# Patient Record
Sex: FEMALE | Race: WHITE | NOT HISPANIC OR LATINO
[De-identification: names, ages, dates, MRNs, and addresses within clinical notes are randomized per-mention and may not be internally consistent; named-entity substitution may affect disease eponyms.]

---

## 2019-07-15 ENCOUNTER — APPOINTMENT (OUTPATIENT)
Dept: ORTHOPEDIC SURGERY | Facility: CLINIC | Age: 74
End: 2019-07-15
Payer: MEDICARE

## 2019-07-15 VITALS — RESPIRATION RATE: 16 BRPM | HEIGHT: 62 IN | BODY MASS INDEX: 19.51 KG/M2 | WEIGHT: 106 LBS

## 2019-07-15 DIAGNOSIS — Z78.9 OTHER SPECIFIED HEALTH STATUS: ICD-10-CM

## 2019-07-15 PROCEDURE — 99203 OFFICE O/P NEW LOW 30 MIN: CPT

## 2019-07-15 PROCEDURE — 73130 X-RAY EXAM OF HAND: CPT | Mod: 50

## 2019-08-12 ENCOUNTER — APPOINTMENT (OUTPATIENT)
Dept: ORTHOPEDIC SURGERY | Facility: CLINIC | Age: 74
End: 2019-08-12
Payer: MEDICARE

## 2019-08-12 VITALS — RESPIRATION RATE: 16 BRPM | HEIGHT: 62 IN | WEIGHT: 106 LBS | BODY MASS INDEX: 19.51 KG/M2

## 2019-08-12 PROCEDURE — 20527 NJX NZM PALMAR FASCIAL CORD: CPT | Mod: LT

## 2019-08-12 PROCEDURE — 99214 OFFICE O/P EST MOD 30 MIN: CPT | Mod: 25

## 2019-08-12 NOTE — PHYSICAL EXAM
[de-identified] : Physical exam demonstrates the patient to be alert and oriented x 3 and capable of ambulation. The patient is well-developed and well-nourished in no apparent respiratory distress. The majority of the skin is intact bilaterally in the upper extremities without any bilateral elbow lymphadenopathy. \par \par \par Evaluation of left hand reveals a webspace cord left ring finger with rotational deformity and a 45° PIP contracture. There is also a lateral sheath cord to the left small finger PIP joint with a 45° cord. 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 Well-healed longitudinal wrist incision. Evaluation of right side reveals palmar fibromatosis most notable long and ring finger without contractures.\par \par \par There is good capillary refill of the digits bilaterally. There are no masses palpated or sensitivity over the median and ulnar nerves at the level of the wrist. There is a negative Tinel's and negative Phalen's and a negative carpal tunnel compression test bilaterally. Sensation is intact to light touch bilaterally.\par \par \par \par

## 2019-08-12 NOTE — ASSESSMENT
[FreeTextEntry1] : My impression is that the patient has Dupuytren's disease. The patient is here for a xiaflex injection. Shared decision-making was undertaken again. The risks, benefits, and alternatives were discussed with the patient in detail again which included, but was not limited to, recurrence, skin tear, tendon ruptures, ecchymosis, lymphadenopathy, bruising, infection, pulley injury, CRPS, extension of disease, need for surgery, pain, hematoma, flare reaction, etc. The patient agreed and under informed consent and sterile conditions the left ring cord was injected with 0.58 mg of xiIaflex without complication. A bulky Hinton bandage was applied. The patient will return for possible extension procedure.\par \par

## 2019-08-12 NOTE — HISTORY OF PRESENT ILLNESS
[Right] : right hand dominant [FreeTextEntry1] : Patient returns for Xiaflex injection Day 1 of left ring finger

## 2019-08-13 ENCOUNTER — APPOINTMENT (OUTPATIENT)
Dept: ORTHOPEDIC SURGERY | Facility: CLINIC | Age: 74
End: 2019-08-13
Payer: MEDICARE

## 2019-08-13 VITALS — BODY MASS INDEX: 19.51 KG/M2 | RESPIRATION RATE: 16 BRPM | WEIGHT: 106 LBS | HEIGHT: 62 IN

## 2019-08-13 PROCEDURE — 26341 MANIPULAT PALM CORD POST INJ: CPT

## 2019-09-24 ENCOUNTER — APPOINTMENT (OUTPATIENT)
Dept: ORTHOPEDIC SURGERY | Facility: CLINIC | Age: 74
End: 2019-09-24
Payer: MEDICARE

## 2019-09-24 VITALS — HEIGHT: 62 IN | WEIGHT: 106 LBS | RESPIRATION RATE: 16 BRPM | BODY MASS INDEX: 19.51 KG/M2

## 2019-09-24 DIAGNOSIS — M72.0 PALMAR FASCIAL FIBROMATOSIS [DUPUYTREN]: ICD-10-CM

## 2019-09-24 PROCEDURE — 99214 OFFICE O/P EST MOD 30 MIN: CPT
